# Patient Record
Sex: FEMALE | Race: WHITE | NOT HISPANIC OR LATINO | URBAN - METROPOLITAN AREA
[De-identification: names, ages, dates, MRNs, and addresses within clinical notes are randomized per-mention and may not be internally consistent; named-entity substitution may affect disease eponyms.]

---

## 2021-02-09 ENCOUNTER — TELEPHONE ENCOUNTER (OUTPATIENT)
Dept: URBAN - METROPOLITAN AREA CLINIC 92 | Facility: CLINIC | Age: 36
End: 2021-02-09

## 2021-02-09 RX ORDER — BUDESONIDE 9 MG/1
1 TABLET IN THE MORNING TABLET, EXTENDED RELEASE ORAL ONCE A DAY
Qty: 30 | OUTPATIENT
Start: 2021-02-09 | End: 2021-03-11

## 2021-02-11 ENCOUNTER — TELEPHONE ENCOUNTER (OUTPATIENT)
Dept: URBAN - METROPOLITAN AREA CLINIC 92 | Facility: CLINIC | Age: 36
End: 2021-02-11

## 2021-02-11 RX ORDER — BUDESONIDE 3 MG/1
3 CAPSULES CAPSULE, COATED PELLETS ORAL ONCE A DAY
Qty: 90 CAPSULE | Refills: 2 | OUTPATIENT

## 2021-02-12 ENCOUNTER — TELEPHONE ENCOUNTER (OUTPATIENT)
Dept: URBAN - METROPOLITAN AREA CLINIC 92 | Facility: CLINIC | Age: 36
End: 2021-02-12

## 2021-02-18 ENCOUNTER — OFFICE VISIT (OUTPATIENT)
Dept: URBAN - NONMETROPOLITAN AREA CLINIC 4 | Facility: CLINIC | Age: 36
End: 2021-02-18
Payer: COMMERCIAL

## 2021-02-18 VITALS
WEIGHT: 165.2 LBS | HEART RATE: 81 BPM | TEMPERATURE: 97.7 F | BODY MASS INDEX: 30.4 KG/M2 | RESPIRATION RATE: 16 BRPM | SYSTOLIC BLOOD PRESSURE: 142 MMHG | DIASTOLIC BLOOD PRESSURE: 82 MMHG | HEIGHT: 62 IN

## 2021-02-18 DIAGNOSIS — K52.832 LYMPHOCYTIC COLITIS: ICD-10-CM

## 2021-02-18 PROCEDURE — G8483 FLU IMM NO ADMIN DOC REA: HCPCS | Performed by: INTERNAL MEDICINE

## 2021-02-18 PROCEDURE — G9903 PT SCRN TBCO ID AS NON USER: HCPCS | Performed by: INTERNAL MEDICINE

## 2021-02-18 PROCEDURE — 99214 OFFICE O/P EST MOD 30 MIN: CPT | Performed by: INTERNAL MEDICINE

## 2021-02-18 PROCEDURE — G8427 DOCREV CUR MEDS BY ELIG CLIN: HCPCS | Performed by: INTERNAL MEDICINE

## 2021-02-18 PROCEDURE — G8417 CALC BMI ABV UP PARAM F/U: HCPCS | Performed by: INTERNAL MEDICINE

## 2021-02-18 PROCEDURE — 1036F TOBACCO NON-USER: CPT | Performed by: INTERNAL MEDICINE

## 2021-02-18 RX ORDER — FLUCONAZOLE 150 MG/1
TABLET ORAL
Qty: 0 | Refills: 0 | Status: DISCONTINUED | COMMUNITY
Start: 2014-06-09

## 2021-02-18 RX ORDER — ONDANSETRON HCL 8 MG
TAKE 1 TABLET BY ORAL ROUTE 3 TIMES A DAY AS NEEDED FOR 15 DAYS TABLET ORAL
Qty: 45 | Refills: 0 | Status: DISCONTINUED | COMMUNITY
Start: 2018-01-11

## 2021-02-18 RX ORDER — BUDESONIDE 9 MG/1
1 TABLET IN THE MORNING TABLET, EXTENDED RELEASE ORAL ONCE A DAY
Qty: 30 | Status: DISCONTINUED | COMMUNITY
Start: 2021-02-09 | End: 2021-03-11

## 2021-02-18 RX ORDER — AZITHROMYCIN 250 MG/1
TABLET, FILM COATED ORAL
Qty: 0 | Refills: 0 | Status: DISCONTINUED | COMMUNITY
Start: 2014-09-08

## 2021-02-18 RX ORDER — VALACYCLOVIR 500 MG/1
TABLET ORAL
Qty: 0 | Refills: 0 | Status: DISCONTINUED | COMMUNITY
Start: 2014-04-12

## 2021-02-18 RX ORDER — BUDESONIDE 3 MG/1
3 CAPSULES CAPSULE, COATED PELLETS ORAL ONCE A DAY
Qty: 90 CAPSULE | Refills: 2 | Status: ACTIVE | COMMUNITY

## 2021-02-18 NOTE — HPI-TODAY'S VISIT:
Pt reports that she has been on the medication with budesonide since 2018 d/t lymphocytic colitis. Pt reports that when she comes off dthe medications she will have mucousy diarrhea.  Pt reports that if she takes 2 she still ok.  Pt reports that she does still have nausea.

## 2021-05-03 ENCOUNTER — TELEPHONE ENCOUNTER (OUTPATIENT)
Dept: URBAN - METROPOLITAN AREA CLINIC 92 | Facility: CLINIC | Age: 36
End: 2021-05-03

## 2021-05-03 RX ORDER — BUDESONIDE 3 MG/1
3 CAPSULES CAPSULE, COATED PELLETS ORAL ONCE A DAY
Qty: 90 CAPSULE | Refills: 2

## 2021-09-27 ENCOUNTER — TELEPHONE ENCOUNTER (OUTPATIENT)
Dept: URBAN - METROPOLITAN AREA CLINIC 92 | Facility: CLINIC | Age: 36
End: 2021-09-27

## 2021-09-27 RX ORDER — BUDESONIDE 3 MG/1
3 CAPSULES CAPSULE, COATED PELLETS ORAL ONCE A DAY
Qty: 90 CAPSULE | Refills: 2

## 2022-01-06 ENCOUNTER — TELEPHONE ENCOUNTER (OUTPATIENT)
Dept: URBAN - NONMETROPOLITAN AREA CLINIC 4 | Facility: CLINIC | Age: 37
End: 2022-01-06

## 2022-01-06 RX ORDER — BUDESONIDE 3 MG/1
3 CAPSULES CAPSULE, COATED PELLETS ORAL ONCE A DAY
Qty: 270 | Refills: 2

## 2022-03-17 ENCOUNTER — WEB ENCOUNTER (OUTPATIENT)
Dept: URBAN - METROPOLITAN AREA CLINIC 54 | Facility: CLINIC | Age: 37
End: 2022-03-17

## 2022-03-21 ENCOUNTER — OFFICE VISIT (OUTPATIENT)
Dept: URBAN - METROPOLITAN AREA CLINIC 54 | Facility: CLINIC | Age: 37
End: 2022-03-21
Payer: COMMERCIAL

## 2022-03-21 DIAGNOSIS — K92.1 HEMATOCHEZIA: ICD-10-CM

## 2022-03-21 DIAGNOSIS — K52.832 LYMPHOCYTIC COLITIS: ICD-10-CM

## 2022-03-21 DIAGNOSIS — K90.41 GLUTEN INTOLERANCE: ICD-10-CM

## 2022-03-21 DIAGNOSIS — K21.9 GERD WITHOUT ESOPHAGITIS: ICD-10-CM

## 2022-03-21 DIAGNOSIS — K58.9 MUCOUS COLITIS: ICD-10-CM

## 2022-03-21 PROCEDURE — 99214 OFFICE O/P EST MOD 30 MIN: CPT | Performed by: INTERNAL MEDICINE

## 2022-03-21 RX ORDER — OMEPRAZOLE AND SODIUM BICARBONATE 40; 1100 MG/1; MG/1
1 CAPSULE ON AN EMPTY STOMACH CAPSULE ORAL ONCE A DAY
Status: ACTIVE | COMMUNITY

## 2022-03-21 RX ORDER — ONDANSETRON 8 MG/1
1 TABLET ON THE TONGUE AND ALLOW TO DISSOLVE  AS NEEDED TABLET, ORALLY DISINTEGRATING ORAL ONCE A DAY
Qty: 30 | OUTPATIENT
Start: 2022-03-21

## 2022-03-21 RX ORDER — BUDESONIDE 3 MG/1
3 CAPSULES CAPSULE, COATED PELLETS ORAL ONCE A DAY
Qty: 270 | Refills: 2 | Status: ACTIVE | COMMUNITY

## 2022-03-21 NOTE — HPI-TODAY'S VISIT:
Patient reports that she was doing well. Pt reports that she took some miralax after constipation. Pt reports that this occurred about 5 days ago. Took some gas x and it was mucousy blood.  Pt reports that it occurred 1 more time.  Pt reports that she had a change in diet Pt reports that she had eliminated gluten.  Pt reports that she had cross.  Pt reports still taking the budesonide.

## 2022-03-28 PROBLEM — 441831003: Status: ACTIVE | Noted: 2022-03-21

## 2022-03-28 PROBLEM — 119523007: Status: ACTIVE | Noted: 2022-03-21

## 2022-03-28 PROBLEM — 266435005: Status: ACTIVE | Noted: 2022-03-21

## 2022-05-17 ENCOUNTER — OFFICE VISIT (OUTPATIENT)
Dept: URBAN - METROPOLITAN AREA SURGERY CENTER 14 | Facility: SURGERY CENTER | Age: 37
End: 2022-05-17

## 2022-08-09 ENCOUNTER — OFFICE VISIT (OUTPATIENT)
Dept: URBAN - METROPOLITAN AREA SURGERY CENTER 14 | Facility: SURGERY CENTER | Age: 37
End: 2022-08-09

## 2022-10-14 ENCOUNTER — OFFICE VISIT (OUTPATIENT)
Dept: URBAN - METROPOLITAN AREA SURGERY CENTER 14 | Facility: SURGERY CENTER | Age: 37
End: 2022-10-14

## 2023-06-15 ENCOUNTER — OFFICE VISIT (OUTPATIENT)
Dept: URBAN - NONMETROPOLITAN AREA CLINIC 4 | Facility: CLINIC | Age: 38
End: 2023-06-15

## 2023-08-22 ENCOUNTER — TELEPHONE ENCOUNTER (OUTPATIENT)
Dept: URBAN - METROPOLITAN AREA CLINIC 63 | Facility: CLINIC | Age: 38
End: 2023-08-22

## 2023-09-11 ENCOUNTER — OFFICE VISIT (OUTPATIENT)
Dept: URBAN - METROPOLITAN AREA CLINIC 48 | Facility: CLINIC | Age: 38
End: 2023-09-11
Payer: COMMERCIAL

## 2023-09-11 VITALS
HEART RATE: 73 BPM | HEIGHT: 62 IN | OXYGEN SATURATION: 99 % | BODY MASS INDEX: 26.31 KG/M2 | DIASTOLIC BLOOD PRESSURE: 73 MMHG | WEIGHT: 143 LBS | SYSTOLIC BLOOD PRESSURE: 126 MMHG | TEMPERATURE: 98.6 F

## 2023-09-11 DIAGNOSIS — K64.9 ACUTE HEMORRHOID: ICD-10-CM

## 2023-09-11 PROCEDURE — 99213 OFFICE O/P EST LOW 20 MIN: CPT | Performed by: INTERNAL MEDICINE

## 2023-09-11 NOTE — HPI-TODAY'S VISIT:
39 y/o female presents for evaluation of hemorrhoids. She has previously been seen by other [facility] physicians, but her most recent physician no longer accepts her insurance at his location. She c/o hemorrhoids which are irritating and cause intermittent throbbing pain. She has tried all of the conservative measures such as increased fiber, fluids, topical hydrocortisone, sitz baths, etc. She recently used some Analpram samples which have given relief, but she would like a repeat banding. She had one several years ago which gave complete resolution to hemorrhoids symptoms for years. She has h/o lymphocytic colitis diagnosed on colonoscopy 2018, treated with Budesonide. She had an EGD at that time and SB Bx was unremarkable, but she states celiac panel was suggestive of possible Celiac. She went gluten free which has changed her life. Bowels are regular as long as she doesn't eat contaminated foods. She feels well aside from the hemorrhoids.

## 2023-09-11 NOTE — PHYSICAL EXAM GASTROINTESTINAL
Abdomen , soft, nontender, nondistended , no guarding or rigidity , no masses palpable , normal bowel sounds , Rectal: perianal skin tags. Small prolapsed internal hemorrhoids which are reducible. Non-bleeding, Non-tender

## 2023-09-26 ENCOUNTER — OFFICE VISIT (OUTPATIENT)
Dept: URBAN - METROPOLITAN AREA CLINIC 44 | Facility: CLINIC | Age: 38
End: 2023-09-26
Payer: COMMERCIAL

## 2023-09-26 ENCOUNTER — DASHBOARD ENCOUNTERS (OUTPATIENT)
Age: 38
End: 2023-09-26

## 2023-09-26 VITALS
TEMPERATURE: 98.2 F | DIASTOLIC BLOOD PRESSURE: 76 MMHG | HEIGHT: 62 IN | SYSTOLIC BLOOD PRESSURE: 142 MMHG | HEART RATE: 79 BPM | WEIGHT: 146 LBS | BODY MASS INDEX: 26.87 KG/M2

## 2023-09-26 DIAGNOSIS — K90.0 CELIAC DISEASE: ICD-10-CM

## 2023-09-26 DIAGNOSIS — K52.832 LYMPHOCYTIC COLITIS: ICD-10-CM

## 2023-09-26 DIAGNOSIS — K62.5 RECTAL BLEEDING: ICD-10-CM

## 2023-09-26 DIAGNOSIS — R68.89 ABNORMAL DIGITAL RECTAL EXAM: ICD-10-CM

## 2023-09-26 PROBLEM — 1187071008 LYMPHOCYTIC COLITIS: Status: ACTIVE | Noted: 2021-02-18

## 2023-09-26 PROBLEM — 396331005: Status: ACTIVE | Noted: 2023-09-26

## 2023-09-26 PROCEDURE — 99213 OFFICE O/P EST LOW 20 MIN: CPT | Performed by: INTERNAL MEDICINE

## 2023-09-26 RX ORDER — PRENATAL WITH FERROUS FUM AND FOLIC ACID 3080; 920; 120; 400; 22; 1.84; 3; 20; 10; 1; 12; 200; 27; 25; 2 [IU]/1; [IU]/1; MG/1; [IU]/1; MG/1; MG/1; MG/1; MG/1; MG/1; MG/1; UG/1; MG/1; MG/1; MG/1; MG/1
1 TABLET TABLET ORAL ONCE A DAY
Status: ACTIVE | COMMUNITY

## 2023-09-26 NOTE — PHYSICAL EXAM RECTAL:
multiple small tags (3); no external hemorrhoids or perianal disease; anterior lesion on palpation in rectum (fissure vs tag vs other)

## 2023-09-26 NOTE — HPI-TODAY'S VISIT:
9/2023: 39 y/o female presents for evaluation of hemorrhoids. She has previously been seen by other [facility] physicians, but her most recent physician no longer accepts her insurance at his location. She c/o hemorrhoids which are irritating and cause intermittent throbbing pain. She has tried all of the conservative measures such as increased fiber, fluids, topical hydrocortisone, sitz baths, etc. She recently used some Analpram samples which have given relief, but she would like a repeat banding. She had one several years ago which gave complete resolution to hemorrhoids symptoms for years. She has h/o lymphocytic colitis diagnosed on colonoscopy 2018, treated with Budesonide. She had an EGD at that time and SB Bx was unremarkable, but she states celiac panel was suggestive of possible Celiac. She went gluten free which has changed her life. Bowels are regular as long as she doesn't eat contaminated foods. She feels well aside from the hemorrhoids.  9/26/2023: Presents for banding. states rectal itching and some bleeding. No diarrhea since gluten free.